# Patient Record
Sex: FEMALE | Race: BLACK OR AFRICAN AMERICAN | NOT HISPANIC OR LATINO | Employment: FULL TIME | RURAL
[De-identification: names, ages, dates, MRNs, and addresses within clinical notes are randomized per-mention and may not be internally consistent; named-entity substitution may affect disease eponyms.]

---

## 2024-10-06 ENCOUNTER — HOSPITAL ENCOUNTER (EMERGENCY)
Facility: HOSPITAL | Age: 64
Discharge: HOME | End: 2024-10-06
Payer: COMMERCIAL

## 2024-10-06 VITALS
WEIGHT: 150 LBS | HEART RATE: 84 BPM | BODY MASS INDEX: 29.45 KG/M2 | RESPIRATION RATE: 17 BRPM | HEIGHT: 60 IN | DIASTOLIC BLOOD PRESSURE: 108 MMHG | TEMPERATURE: 98 F | OXYGEN SATURATION: 99 % | SYSTOLIC BLOOD PRESSURE: 193 MMHG

## 2024-10-06 DIAGNOSIS — R03.0 ELEVATED BLOOD PRESSURE READING: ICD-10-CM

## 2024-10-06 DIAGNOSIS — M25.511 ACUTE PAIN OF RIGHT SHOULDER: ICD-10-CM

## 2024-10-06 DIAGNOSIS — S42.201A CLOSED FRACTURE OF PROXIMAL END OF RIGHT HUMERUS, UNSPECIFIED FRACTURE MORPHOLOGY, INITIAL ENCOUNTER: Primary | ICD-10-CM
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
## 2024-10-06 PROCEDURE — 99284 EMERGENCY DEPT VISIT MOD MDM: CPT | Mod: ,,, | Performed by: NURSE PRACTITIONER

## 2024-10-06 PROCEDURE — 99284 EMERGENCY DEPT VISIT MOD MDM: CPT | Mod: 25

## 2024-10-06 RX ORDER — IBUPROFEN 800 MG/1
800 TABLET ORAL 3 TIMES DAILY PRN
COMMUNITY

## 2024-10-06 RX ORDER — HYDROCODONE BITARTRATE AND ACETAMINOPHEN 7.5; 325 MG/1; MG/1
1 TABLET ORAL EVERY 6 HOURS PRN
Qty: 12 TABLET | Refills: 0 | Status: SHIPPED | OUTPATIENT
Start: 2024-10-06

## 2024-10-06 RX ORDER — OLMESARTAN MEDOXOMIL AND HYDROCHLOROTHIAZIDE 40/25 40; 25 MG/1; MG/1
1 TABLET ORAL DAILY
Qty: 30 TABLET | Refills: 3 | Status: SHIPPED | OUTPATIENT
Start: 2024-10-06 | End: 2025-10-06

## 2024-10-06 RX ORDER — GABAPENTIN 600 MG/1
600 TABLET ORAL 2 TIMES DAILY
COMMUNITY

## 2024-10-06 NOTE — Clinical Note
"Emily Gerard" Padmini was seen and treated in our emergency department on 10/6/2024.  She may return to work on 10/08/2024.       If you have any questions or concerns, please don't hesitate to call.      Tom Mason, TI"

## 2024-10-06 NOTE — ED TRIAGE NOTES
Presents with right shoulder injury. Pt states she fell on her porch Thursday landing on right side. Taking her prescribed Motrin and using Bengay rub for pain.

## 2024-10-06 NOTE — ED PROVIDER NOTES
Encounter Date: 10/6/2024       History     Chief Complaint   Patient presents with    Shoulder Injury     63 y/o AAF with PMHx of HTN presents pov with c/o right shoulder pain after falling and injuring shoulder 4 days ago. States she tripped on an electrical cord on her porch and injured the shoulder when she fell. She also c/o pain/swelling right middle finger which she also injured when she fell. Rates shoulder pain 10/10 with movement but no pain otherwise.    The history is provided by the patient.     Review of patient's allergies indicates:  No Known Allergies  Past Medical History:   Diagnosis Date    Hypertension      Past Surgical History:   Procedure Laterality Date    HYSTERECTOMY       No family history on file.  Social History     Tobacco Use    Smoking status: Every Day     Types: Cigarettes    Smokeless tobacco: Never   Substance Use Topics    Alcohol use: Yes     Alcohol/week: 2.0 standard drinks of alcohol     Types: 2 Shots of liquor per week    Drug use: Never     Review of Systems   Constitutional:  Negative for chills and fever.   Respiratory: Negative.  Negative for apnea, cough, choking, chest tightness, shortness of breath, wheezing and stridor.    Cardiovascular: Negative.  Negative for chest pain, palpitations and leg swelling.   Musculoskeletal:  Positive for arthralgias.        Right shoulder pain   Skin: Negative.    Neurological: Negative.    Psychiatric/Behavioral: Negative.     All other systems reviewed and are negative.      Physical Exam     Initial Vitals [10/06/24 0925]   BP Pulse Resp Temp SpO2   (!) 223/98 84 17 98.1 °F (36.7 °C) 99 %      MAP       --         Physical Exam    Nursing note and vitals reviewed.  Constitutional: She appears well-developed and well-nourished.   Neck: Neck supple.   Normal range of motion.  Cardiovascular:  Normal rate, regular rhythm, normal heart sounds and intact distal pulses.     Exam reveals no gallop and no friction rub.       No murmur  heard.  Pulmonary/Chest: Breath sounds normal. No respiratory distress. She has no wheezes. She has no rhonchi. She has no rales. She exhibits no tenderness.   Musculoskeletal:         General: Tenderness present.      Right shoulder: Tenderness present. No swelling, deformity, effusion, bony tenderness or crepitus. Decreased range of motion. Normal strength. Normal pulse.      Cervical back: Normal range of motion and neck supple.     Lymphadenopathy:     She has no cervical adenopathy.   Skin: Skin is warm and dry. Capillary refill takes less than 2 seconds.   Psychiatric: She has a normal mood and affect. Her behavior is normal. Judgment and thought content normal.         Medical Screening Exam   See Full Note    ED Course   Procedures  Labs Reviewed - No data to display       Imaging Results              X-Ray Finger 2 or More Views Right (Final result)  Result time 10/06/24 10:43:41      Final result by Art Chopra MD (10/06/24 10:43:41)                   Impression:      Radial aspect hooked osteophytes at the metacarpal heads.  Correlate hemochromatosis, CPPD, rheumatoid arthritis or osteoarthritis.    No acute findings evident within the right middle finger.      Electronically signed by: Art Chopra  Date:    10/06/2024  Time:    10:43               Narrative:    EXAMINATION:  XR FINGER 2 OR MORE VIEWS RIGHT    CLINICAL HISTORY:  Injury;    TECHNIQUE:  Three views of the right middle finger    COMPARISON:  None    FINDINGS:  Bones joint spaces and soft tissues intact without fracture dislocation or soft tissue swelling or foreign body.  A small hook osteophytes at the metacarpal heads.                                        X-ray Shoulder 2 or More Views Right (Final result)  Result time 10/06/24 10:30:42   Procedure changed from X-Ray Shoulder Trauma Right     Final result by Art Chopra MD (10/06/24 10:30:42)                   Impression:      Fracture of the right humerus from the  metadiaphysis through the great tubercle up to the anatomical neck.  Consider CT if further imaging is clinically warranted.    Degenerative changes in the glenohumeral joint and AC joint with likely shoulder outlet impingement.    This report was flagged in [employer] as abnormal.      Electronically signed by: Art Chopra  Date:    10/06/2024  Time:    10:30               Narrative:    EXAMINATION:  XR SHOULDER COMPLETE 2 OR MORE VIEWS RIGHT    CLINICAL HISTORY:  fall shoulder pain, x3 days;Unspecified fall, initial encounter    TECHNIQUE:  Two or three views of the right shoulder were performed.    COMPARISON:  None    FINDINGS:  Fracture of the greater tuberosity is noted with extension from the metadiaphysis into the anatomical neck.  No significant distraction.  Degenerative changes in the glenohumeral joint without dislocation.  Downward spurring of the acromion raises the question of impingement.                                       Medications - No data to display  Medical Decision Making  65 y/o AAF PMHx of HTN presents pov with c/o right shoulder pain after falling and injuring shoulder 4 days ago. States she tripped on an electrical cord on her porch and injured the shoulder when she fell. She also c/o pain/swelling right middle finger which she also injured when she fell. Rates shoulder pain 10/10 with movement but no pain otherwise.    Amount and/or Complexity of Data Reviewed  Radiology: ordered.     Details: XR Right shoulder: Fracture of the right humerus from the metadiaphysis through the great tubercle up to the anatomical neck.  XR Right Middle Finger:   Radial aspect hooked osteophytes at the metacarpal heads.  Correlate hemochromatosis, CPPD, rheumatoid arthritis or osteoarthritis.   No acute findings evident within the right middle finger.      Risk  Prescription drug management.                                      Clinical Impression:   Final diagnoses:  [W19.XXXA] Fall with injury  [R03.0]  Elevated blood pressure reading  [M25.511] Acute pain of right shoulder  [S42.201A] Closed fracture of proximal end of right humerus, unspecified fracture morphology, initial encounter (Primary)        ED Disposition Condition    Discharge Stable          ED Prescriptions       Medication Sig Dispense Start Date End Date Auth. Provider    olmesartan-hydrochlorothiazide (BENICAR HCT) 40-25 mg per tablet Take 1 tablet by mouth once daily. 30 tablet 10/6/2024 10/6/2025 Tom Mason FNP    HYDROcodone-acetaminophen (NORCO) 7.5-325 mg per tablet Take 1 tablet by mouth every 6 (six) hours as needed (shoulder pain). 12 tablet 10/6/2024 -- Tom Mason FNP          Follow-up Information    None          Tom Mason FNP  10/06/24 1111

## 2024-10-30 DIAGNOSIS — S42.201D UNSPECIFIED FRACTURE OF UPPER END OF RIGHT HUMERUS, SUBSEQUENT ENCOUNTER FOR FRACTURE WITH ROUTINE HEALING: ICD-10-CM

## 2024-10-30 DIAGNOSIS — M25.511 PAIN IN RIGHT SHOULDER: Primary | ICD-10-CM
